# Patient Record
Sex: FEMALE | Race: WHITE | NOT HISPANIC OR LATINO
[De-identification: names, ages, dates, MRNs, and addresses within clinical notes are randomized per-mention and may not be internally consistent; named-entity substitution may affect disease eponyms.]

---

## 2017-03-29 ENCOUNTER — RECORDS - HEALTHEAST (OUTPATIENT)
Dept: ADMINISTRATIVE | Facility: OTHER | Age: 62
End: 2017-03-29

## 2017-04-18 ENCOUNTER — HOSPITAL ENCOUNTER (OUTPATIENT)
Dept: CARDIOLOGY | Facility: CLINIC | Age: 62
Discharge: HOME | End: 2017-04-18
Attending: PSYCHIATRY & NEUROLOGY

## 2017-04-18 DIAGNOSIS — R51.9 HEADACHE: ICD-10-CM

## 2017-04-18 DIAGNOSIS — G45.9 TIA (TRANSIENT ISCHEMIC ATTACK): ICD-10-CM

## 2017-04-18 LAB
AORTIC ROOT: 3.1 CM
BSA FOR ECHO PROCEDURE: 1.92 M2
CV BLOOD PRESSURE: NORMAL MMHG
CV ECHO HEIGHT: 64 IN
CV ECHO WEIGHT: 180 LBS
DOP CALC LVOT AREA: 3.14 CM2
DOP CALC LVOT DIAMETER: 2 CM
DOP CALC LVOT PEAK VEL: 89.3 CM/S
DOP CALC LVOT STROKE VOLUME: 52.8 CM3
DOP CALCLVOT PEAK VEL VTI: 16.8 CM
EJECTION FRACTION: 57 % (ref 55–75)
FRACTIONAL SHORTENING: 34.6 % (ref 28–44)
INTERVENTRICULAR SEPTUM IN END DIASTOLE: 1.01 CM (ref 0.6–0.9)
IVS/PW RATIO: 1
LA AREA 1: 13.9 CM2
LA AREA 2: 15.2 CM2
LEFT ATRIUM LENGTH: 4.09 CM
LEFT ATRIUM SIZE: 4.1 CM
LEFT ATRIUM TO AORTIC ROOT RATIO: 1.32 NO UNITS
LEFT ATRIUM VOLUME INDEX: 22.9 ML/M2
LEFT ATRIUM VOLUME: 43.9 CM3
LEFT VENTRICLE CARDIAC INDEX: 1.9 L/MIN/M2
LEFT VENTRICLE CARDIAC OUTPUT: 3.6 L/MIN
LEFT VENTRICLE DIASTOLIC VOLUME INDEX: 46.4 CM3/M2 (ref 34–74)
LEFT VENTRICLE DIASTOLIC VOLUME: 89 CM3 (ref 46–106)
LEFT VENTRICLE HEART RATE: 69 BPM
LEFT VENTRICLE MASS INDEX: 94.8 G/M2
LEFT VENTRICLE SYSTOLIC VOLUME INDEX: 19.8 CM3/M2 (ref 11–31)
LEFT VENTRICLE SYSTOLIC VOLUME: 38 CM3 (ref 14–42)
LEFT VENTRICULAR INTERNAL DIMENSION IN DIASTOLE: 4.88 CM (ref 3.8–5.2)
LEFT VENTRICULAR INTERNAL DIMENSION IN SYSTOLE: 3.19 CM (ref 2.2–3.5)
LEFT VENTRICULAR MASS: 182 G
LEFT VENTRICULAR OUTFLOW TRACT MEAN GRADIENT: 2 MMHG
LEFT VENTRICULAR OUTFLOW TRACT MEAN VELOCITY: 58.4 CM/S
LEFT VENTRICULAR OUTFLOW TRACT PEAK GRADIENT: 3 MMHG
LEFT VENTRICULAR POSTERIOR WALL IN END DIASTOLE: 1.05 CM (ref 0.6–0.9)
LV STROKE VOLUME INDEX: 27.5 ML/M2
MITRAL VALVE E/A RATIO: 1.1
MV AVERAGE E/E' RATIO: 7.4 CM/S
MV DECELERATION TIME: 204 MS
MV E'TISSUE VEL-LAT: 10 CM/S
MV E'TISSUE VEL-MED: 9.07 CM/S
MV LATERAL E/E' RATIO: 7
MV MEDIAL E/E' RATIO: 7.8
MV PEAK A VELOCITY: 61.7 CM/S
MV PEAK E VELOCITY: 70.3 CM/S
NUC REST DIASTOLIC VOLUME INDEX: 2880 LBS
NUC REST SYSTOLIC VOLUME INDEX: 64 IN

## 2017-04-18 ASSESSMENT — MIFFLIN-ST. JEOR: SCORE: 1341.47

## 2021-05-24 ENCOUNTER — RECORDS - HEALTHEAST (OUTPATIENT)
Dept: ADMINISTRATIVE | Facility: CLINIC | Age: 66
End: 2021-05-24

## 2021-05-25 ENCOUNTER — RECORDS - HEALTHEAST (OUTPATIENT)
Dept: ADMINISTRATIVE | Facility: CLINIC | Age: 66
End: 2021-05-25

## 2021-05-26 ENCOUNTER — RECORDS - HEALTHEAST (OUTPATIENT)
Dept: ADMINISTRATIVE | Facility: CLINIC | Age: 66
End: 2021-05-26

## 2021-05-27 ENCOUNTER — RECORDS - HEALTHEAST (OUTPATIENT)
Dept: ADMINISTRATIVE | Facility: CLINIC | Age: 66
End: 2021-05-27

## 2021-05-28 ENCOUNTER — RECORDS - HEALTHEAST (OUTPATIENT)
Dept: ADMINISTRATIVE | Facility: CLINIC | Age: 66
End: 2021-05-28

## 2021-05-30 VITALS — WEIGHT: 180 LBS | BODY MASS INDEX: 30.73 KG/M2 | HEIGHT: 64 IN

## 2024-08-14 ENCOUNTER — TELEPHONE (OUTPATIENT)
Dept: OPHTHALMOLOGY | Facility: CLINIC | Age: 69
End: 2024-08-14
Payer: COMMERCIAL

## 2024-08-14 ENCOUNTER — TRANSCRIBE ORDERS (OUTPATIENT)
Dept: OTHER | Age: 69
End: 2024-08-14

## 2024-08-14 DIAGNOSIS — H57.13 EYE PAIN, BILATERAL: Primary | ICD-10-CM

## 2024-08-14 NOTE — TELEPHONE ENCOUNTER
[facility] Call Center    Phone Message    May a detailed message be left on voicemail: yes     Reason for Call: Appointment Intake    Referring Provider Name: Ban Roman, OD @[facility] [address] [place] [address]   Phone: [phone]   Fax: [phone]     Diagnosis and/or Symptoms: Eye pain    I did reach out to the pt, to get her scheduled but referral states for Neuro Opth.  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  Please review    She said if you get her vm please leave a detailed msg and call back number    Action Taken: Message routed to:  [facility] eye    Travel Screening: Not Applicable     Date of Service:

## 2024-08-15 NOTE — TELEPHONE ENCOUNTER
Per referring note: She will work with OT; if no improvement, could see neuroophthal referral.     Patient should start with OT first, but if she declines, let's do next available with Kimberly in neuro clinic.      Johanny Falk Communication Facilitator on 8/15/2024 at 10:49 AM